# Patient Record
Sex: MALE | Race: WHITE | HISPANIC OR LATINO | Employment: FULL TIME | URBAN - METROPOLITAN AREA
[De-identification: names, ages, dates, MRNs, and addresses within clinical notes are randomized per-mention and may not be internally consistent; named-entity substitution may affect disease eponyms.]

---

## 2019-08-21 ENCOUNTER — OCCUPATIONAL MEDICINE (OUTPATIENT)
Dept: URGENT CARE | Facility: CLINIC | Age: 24
End: 2019-08-21
Payer: COMMERCIAL

## 2019-08-21 VITALS
HEIGHT: 76 IN | SYSTOLIC BLOOD PRESSURE: 138 MMHG | WEIGHT: 206.13 LBS | TEMPERATURE: 98 F | RESPIRATION RATE: 14 BRPM | OXYGEN SATURATION: 98 % | HEART RATE: 88 BPM | DIASTOLIC BLOOD PRESSURE: 70 MMHG | BODY MASS INDEX: 25.1 KG/M2

## 2019-08-21 DIAGNOSIS — S05.01XA ABRASION OF RIGHT CORNEA, INITIAL ENCOUNTER: ICD-10-CM

## 2019-08-21 DIAGNOSIS — H00.022 HORDEOLUM INTERNUM OF RIGHT LOWER EYELID: ICD-10-CM

## 2019-08-21 PROCEDURE — 99204 OFFICE O/P NEW MOD 45 MIN: CPT | Performed by: PHYSICIAN ASSISTANT

## 2019-08-21 RX ORDER — OFLOXACIN 3 MG/ML
SOLUTION/ DROPS OPHTHALMIC
Qty: 1 BOTTLE | Refills: 0 | Status: SHIPPED | OUTPATIENT
Start: 2019-08-21

## 2019-08-21 ASSESSMENT — ENCOUNTER SYMPTOMS
BLURRED VISION: 0
EYE ITCHING: 0
FEVER: 0
PHOTOPHOBIA: 0
FOREIGN BODY SENSATION: 0
TINGLING: 0
DIZZINESS: 0
EYE DISCHARGE: 0
EYE PAIN: 1
HEADACHES: 0
COUGH: 0
NAUSEA: 0
DOUBLE VISION: 0

## 2019-08-21 ASSESSMENT — VISUAL ACUITY
OS_CC: 20/20
OD_CC: 20/25

## 2019-08-21 NOTE — LETTER
[facility]  [address] [facility], [address] [place] [address]  Phone:  [phone] - Fax:  [phone]   [facility] Progress Report and Disability Certification  Date of Service: 8/21/2019   No Show:  No  Date / Time of Next Visit:     Claim Information   Patient Name: Tavares Martin  Claim Number:     Employer: [employer]  Date of Injury: 8/19/2019     Insurer / TPA: [employer]  ID / SSN:    Occupation: Maintenance Tech  Diagnosis: Diagnoses of Hordeolum internum of right lower eyelid and Abrasion of right cornea, initial encounter were pertinent to this visit.    Medical Information   Related to Industrial Injury? No  Comments:There was no specific injury or incident.  Patient noticed discomfort of the upper lid at work.    Subjective Complaints:  Date of injury was August 19, 2019.  Approximately 4:30 PM.  Patient is an employee for [employer].  Was wearing protective lenses.  Is in maintenance.  Denies known trauma or injury to the eye.  Denies any moment that he felt an injury but noticed some redness and tenderness of his right upper outer eyelid.  Noticed it worse that evening and has worsened in the last 24 hours.  Has pain when he presses on the outer lid.  Again denies previous injury that would contribute.  Denies known incident that occurred specifically at work.  States he just noticed the discomfort at work.  Denies blurry vision, double vision, discharge from the eye or eyelids.  Denies recent cough or cold   Objective Findings: General Pleasant male no acute distress  Vitals are listed unremarkable  HEENT patient has good PERRLA and extraocular movements intact.  He has increased upper lid palpebral conjunctival injection noted.  He has some redness of the right upper outer eyelid.  There is no discharge or matting noted.  No external hordeolum noted  Neck: Soft supple without cervical lymph  Cardiovascular: Regular 8 rhythm  Lungs clear to  auscultation  Musculoskeletal: Full range of motion  Neurovascular intact  Neurological: No deficit noted    Urgent CARE course: Fluorescein stain was done.  Reveals very faint and small abrasion at the 9 o'clock position of the right eye.  Patient also is tender to palpation in the right upper outer eyelid.  Inversion of upper outer lid shows a small internal hordeolum corresponding to her patient is most tender   Pre-Existing Condition(s):     Assessment:   Initial Visit    Status: Additional Care Required  Permanent Disability:No    Plan:      Diagnostics:      Comments:       Disability Information   Status: Released to Full Duty    From:     Through:   Restrictions are:     Physical Restrictions   Sitting:    Standing:    Stooping:    Bending:      Squatting:    Walking:    Climbing:    Pushing:      Pulling:    Other:    Reaching Above Shoulder (L):   Reaching Above Shoulder (R):       Reaching Below Shoulder (L):    Reaching Below Shoulder (R):      Not to exceed Weight Limits   Carrying(hrs):   Weight Limit(lb):   Lifting(hrs):   Weight  Limit(lb):     Comments: Patient is released to full duty.  However I would like him to start antibiotic drops, use warm compresses and follow-up in 3 days for further evaluation    Repetitive Actions   Hands: i.e. Fine Manipulations from Grasping:     Feet: i.e. Operating Foot Controls:     Driving / Operate Machinery:     Physician Name: Arslan [place] P.A.-C. Physician Signature: ARSLAN Roman P.A.-C. e-Signature: Dr. Dimitri Rosenbaum, Medical Director   Clinic Name / Location: [address], [address] [place] [address] Clinic Phone Number: Dept: [phone]   Appointment Time: 12:00 Pm Visit Start Time: 12:13 PM   Check-In Time:  11:58 Am Visit Discharge Time:     Original-Treating Physician or Chiropractor    Page 2-Insurer/TPA    Page 3-Employer    Page 4-Employee

## 2019-08-21 NOTE — PROGRESS NOTES
Subjective:      Tavares Martin is a 24 y.o. male who presents with Eye Problem (WC Fv, Possible exposure to chemicals/foreign body R/ eye)      Date of injury was August 19, 2019.  Approximately 4:30 PM.  Patient is an employee for [employer].  Was wearing protective lenses.  Is in maintenance.  Denies known trauma or injury to the eye.  Denies any moment that he felt an injury but noticed some redness and tenderness of his right upper outer eyelid.  Noticed it worse that evening and has worsened in the last 24 hours.  Has pain when he presses on the outer lid.  Again denies previous injury that would contribute.  Denies known incident that occurred specifically at work.  States he just noticed the discomfort at work.  Denies blurry vision, double vision, discharge from the eye or eyelids.  Denies recent cough or cold     Eye Problem    The right eye is affected. This is a new problem. Episode onset: 2 days ago. The problem occurs constantly. The problem has been gradually worsening. There was no injury mechanism. The pain is at a severity of 3/10. The pain is mild. There is no known exposure to pink eye. He does not wear contacts. Pertinent negatives include no blurred vision, eye discharge, double vision, fever, foreign body sensation, itching, nausea, photophobia or tingling. Associated symptoms comments: Painful with upper outer eyelid when pressing on it. He has tried nothing for the symptoms. The treatment provided no relief.       Past medical history: Is not pertinent to this examination  Past surgical history: Not pertinent to this examination  Family history: Is not pertinent to this examination  Allergies: No known drug allergies  Social history: Is reviewed in Epic  No current outpatient medications on file prior to visit.     No current facility-administered medications on file prior to visit.          Review of Systems   Constitutional: Negative for fever.   HENT: Negative for ear discharge and ear pain.   "  Eyes: Positive for pain. Negative for blurred vision, double vision, photophobia, discharge and itching.   Respiratory: Negative for cough.    Cardiovascular: Negative for chest pain.   Gastrointestinal: Negative for nausea.   Neurological: Negative for dizziness, tingling and headaches.   All other systems reviewed and are negative.         Objective:     /70   Pulse 88   Temp 36.7 °C (98 °F) (Temporal)   Resp 14   Ht 1.93 m (6' 4\")   Wt 93.5 kg (206 lb 2.1 oz)   SpO2 98%   BMI 25.09 kg/m²      Physical Exam   Constitutional: He appears well-developed and well-nourished.   Eyes:       As noted in diagram, small corneal abrasion seen at 9 o'clock position.  Patient also has significant tenderness to upper right eyelid in the lateral aspect       General Pleasant male no acute distress  Vitals are listed unremarkable  HEENT patient has good PERRLA and extraocular movements intact.  He has increased upper lid palpebral conjunctival injection noted.  He has some redness of the right upper outer eyelid.  There is no discharge or matting noted.  No external hordeolum noted  Neck: Soft supple without cervical lymph  Cardiovascular: Regular 8 rhythm  Lungs clear to auscultation  Musculoskeletal: Full range of motion  Neurovascular intact  Neurological: No deficit noted    Urgent CARE course: Fluorescein stain was done.  Reveals very faint and small abrasion at the 9 o'clock position of the right eye.  Patient also is tender to palpation in the right upper outer eyelid.  Inversion of upper outer lid shows a small internal hordeolum corresponding to her patient is most tender       Assessment/Plan:     1. Hordeolum internum of right lower eyelid    - ofloxacin (OCUFLOX) 0.3 % Solution; Apply one drop affected eye every two hours for two days and then every four hours for five days  Dispense: 1 Bottle; Refill: 0    2. Abrasion of right cornea, initial encounter    - ofloxacin (OCUFLOX) 0.3 % Solution; Apply one " drop affected eye every two hours for two days and then every four hours for five days  Dispense: 1 Bottle; Refill: 0      Patient denies any known incident, trauma or injury to the eye at work.  Noticed that he felt discomfort of the upper lid at work.  There is no direct correlation to internal hordeolum and work incident.  Therefore I noted in the Worker's Compensation forms that it was not necessarily directly work related.  Did fill out the C4 and D 39.  Patient can perform full duties.  Sent over antibiotic drops.  Use warm compresses.  Follow-up in 3 days    Of note I did speak with Nila Parks, to discuss case with him at 12:50 PM

## 2019-08-21 NOTE — LETTER
"EMPLOYEE’S CLAIM FOR COMPENSATION/ REPORT OF INITIAL TREATMENT  FORM C-4    EMPLOYEE’S CLAIM - PROVIDE ALL INFORMATION REQUESTED   First Name  Taavres Last Name  Mario Birthdate                    1995                Sex  male Claim Number   Home Address  [address] Age  24 y.o. 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 (if applicable)  working on [employer] fixtures    How did this injury or occupational disease occur? (Be specific an answer in detail. Use additional sheet if necessary)  Was working on [employer] fixtures, I belive I contaminated my eye while blowing off surface carrier dust / glass bead contamination   If you believe that you have an occupational disease, when did you first have knowledge of the disability and it relationship to your employment?  n/a Witnesses to the Accident  none      Nature of Injury or Occupational Disease  Workers' Compensation  Part(s) of Body Injured or Affected  Eye (R), N/A, N/A    I certify that the above is true and correct to the best of my knowledge and that I have provided this information in order " to obtain the benefits of [place]  I hereby authorize any physician, chiropractor, surgeon, practitioner, or other person, any hospital, including [facility] or [facility] hospital, any medical service organization, any insurance company, or other institution or organization to release to each other, any medical or other information, including benefits paid or payable, pertinent to this injury or disease, except information relative to diagnosis, treatment and/or counseling for AIDS, psychological conditions, alcohol or controlled substances, for which I must give specific authorization.  A Photostat of this authorization shall be as valid as the original.     Date   Place   Employee’s Signature   THIS REPORT MUST BE COMPLETED AND MAILED WITHIN 3 WORKING DAYS OF TREATMENT   Place  [facility]  Name of Facility  [facility]   Date  8/21/2019 Diagnosis  (H00.022) Hordeolum internum of right lower eyelid  (S05.01XA) Abrasion of right cornea, initial encounter Is there evidence the injured employee was under the influence of alcohol and/or another controlled substance at the time of accident?   Hour  12:13 PM Description of Injury or Disease  Diagnoses of Hordeolum internum of right lower eyelid and Abrasion of right cornea, initial encounter were pertinent to this visit. No   Treatment  Warm compresses recommended 10 minutes a day, 3 times daily.  Take antibiotic drops as directed.  Ofloxacin drops for the week  Have you advised the patient to remain off work five days or more? No   X-Ray Findings    Comments:Not applicable   If Yes   From Date  To Date      From information given by the employee, together with medical evidence, can you directly connect this injury or occupational disease as job incurred?  No  Comments:There was no known trauma or injury to the eye.  Patient has signs  "suggestive of an internal hordeolum.  Very faint corneal abrasion at the 9 o'clock position of the right eye as well. If No Full Duty  Yes Modified Duty      Is additional medical care by a physician indicated?  Yes  Comments:Recommend follow-up in the next 3 business days for further evaluation If Modified Duty, Specify any Limitations / Restrictions      Do you know of any previous injury or disease contributing to this condition or occupational disease?                            No   Date  8/21/2019 Print Doctor’s Name Arslan [place] P.A.-C. I certify the employer’s copy of  this form was mailed on:   Address  [address] Insurer’s Use Only     [place] Zip  [address]    Provider’s Tax ID Number  [id number] Telephone  Dept: [phone]        e-ARSLAN Gaxiola P.A.-C.   e-Signature: Dr. Dimitri Rosenbaum, Medical Director Degree  MD        ORIGINAL-TREATING PHYSICIAN OR CHIROPRACTOR    PAGE 2-INSURER/TPA    PAGE 3-EMPLOYER    PAGE 4-EMPLOYEE             Form C-4 (rev10/07)              BRIEF DESCRIPTION OF RIGHTS AND BENEFITS  (Pursuant to [place] 616C.[place])    Notice of Injury or Occupational Disease (Incident Report Form C-1): If an injury or occupational disease (OD) arises out of and in the  course of employment, you must provide written notice to your employer as soon as practicable, but no later than 7 days after the accident or  OD. Your employer shall maintain a sufficient supply of the required forms.    Claim for Compensation (Form C-4): If medical treatment is sought, the form C-4 is available at the place of initial treatment. A completed  \"Claim for Compensation\" (Form C-4) must be filed within 90 days after an accident or OD. The treating physician or chiropractor must,  within 3 working days after treatment, complete and mail to the employer, the employer's insurer and third-party , the Claim for  Compensation.    Medical Treatment: If you require medical " treatment for your on-the-job injury or OD, you may be required to select a physician or  chiropractor from a list provided by your workers’ compensation insurer, if it has contracted with an Organization for Managed Care (MCO) or  Preferred Provider Organization (PPO) or providers of health care. If your employer has not entered into a contract with an MCO or PPO, you  may select a physician or chiropractor from the Panel of Physicians and Chiropractors. Any medical costs related to your industrial injury or  OD will be paid by your insurer.    Temporary Total Disability (TTD): If your doctor has certified that you are unable to work for a period of at least 5 consecutive days, or 5  cumulative days in a 20-day period, or places restrictions on you that your employer does not accommodate, you may be entitled to TTD  compensation.    Temporary Partial Disability (TPD): If the wage you receive upon reemployment is less than the compensation for TTD to which you are  entitled, the insurer may be required to pay you TPD compensation to make up the difference. TPD can only be paid for a maximum of 24  months.    Permanent Partial Disability (PPD): When your medical condition is stable and there is an indication of a PPD as a result of your injury or  OD, within 30 days, your insurer must arrange for an evaluation by a rating physician or chiropractor to determine the degree of your PPD. The  amount of your PPD award depends on the date of injury, the results of the PPD evaluation and your age and wage.    Permanent Total Disability (PTD): If you are medically certified by a treating physician or chiropractor as permanently and totally disabled  and have been granted a PTD status by your insurer, you are entitled to receive monthly benefits not to exceed 66 2/3% of your average  monthly wage. The amount of your PTD payments is subject to reduction if you previously received a PPD award.    Vocational Rehabilitation  Services: You may be eligible for vocational rehabilitation services if you are unable to return to the job due to a  permanent physical impairment or permanent restrictions as a result of your injury or occupational disease.    Transportation and Per Janis Reimbursement: You may be eligible for travel expenses and per janis associated with medical treatment.    Reopening: You may be able to reopen your claim if your condition worsens after claim closure.    Appeal Process: If you disagree with a written determination issued by the insurer or the insurer does not respond to your request, you may  appeal to the Department of Administration, , by following the instructions contained in your determination letter. You must  appeal the determination within 70 days from the date of the determination letter at [address], [address], [place], [place]  [address], or [address], [address], [place], [place] [address]. If you disagree with the  decision, you may appeal to the  Department of Administration, . You must file your appeal within 30 days from the date of the  decision  letter at [address], [address], [place], [place] [address], or [address], [address], [place], [place] [address]. If you  disagree with a decision of an , you may file a petition for judicial review with the District Court. You must do so within 30  days of the Appeal Officer’s decision. You may be represented by an  at your own expense or you may contact the [facility] for possible  representation.    [place]  for Injured Workers (NAIW): If you disagree with a  decision, you may request that NAIW represent you  without charge at an  Hearing. For information regarding denial of benefits, you may contact the [facility] at: [address], [address], [place] [address], [phone], or [address], [address], [place] [address], [phone]    To File a Complaint with the Division: If you wish to file a complaint with the  of the Division of Industrial Relations (DIR),  please contact the Workers’ Compensation Section, [address], [address], [place], [place] [address], telephone (420) [phone], or  [address], [address], [place], [place] [address], telephone [phone]) [phone].    For assistance with Workers’ Compensation Issues: you may contact the Office of the Governor Consumer Health Assistance, [address] [place] [address], [address], [place], [place] [address], Toll Free 1-217.537.4662, Web site: http://govcha.[facility].nv.us, E-mail  Jayla@[email or web address].[email or web address].                                                                                                                                                                                                                                   __________________________________________________________________                                                                   _________________                Employee Name / Signature                                                                                                                                                       Date                                                                                                                                                                                                     D-2 (rev. 10/07)

## 2020-10-26 ENCOUNTER — OFFICE VISIT (OUTPATIENT)
Dept: URGENT CARE | Facility: CLINIC | Age: 25
End: 2020-10-26
Payer: COMMERCIAL

## 2020-10-26 VITALS
DIASTOLIC BLOOD PRESSURE: 70 MMHG | HEART RATE: 82 BPM | RESPIRATION RATE: 12 BRPM | WEIGHT: 230 LBS | SYSTOLIC BLOOD PRESSURE: 126 MMHG | HEIGHT: 76 IN | TEMPERATURE: 98.4 F | OXYGEN SATURATION: 98 % | BODY MASS INDEX: 28.01 KG/M2

## 2020-10-26 DIAGNOSIS — J02.9 PHARYNGITIS, UNSPECIFIED ETIOLOGY: ICD-10-CM

## 2020-10-26 DIAGNOSIS — J02.0 STREP THROAT: ICD-10-CM

## 2020-10-26 LAB
INT CON NEG: NORMAL
INT CON POS: NORMAL
S PYO AG THROAT QL: NORMAL

## 2020-10-26 PROCEDURE — 99214 OFFICE O/P EST MOD 30 MIN: CPT | Performed by: NURSE PRACTITIONER

## 2020-10-26 PROCEDURE — 87880 STREP A ASSAY W/OPTIC: CPT | Performed by: NURSE PRACTITIONER

## 2020-10-26 RX ORDER — AMOXICILLIN 500 MG/1
1000 CAPSULE ORAL DAILY
Qty: 20 CAP | Refills: 0 | Status: SHIPPED | OUTPATIENT
Start: 2020-10-26 | End: 2020-11-05

## 2020-10-26 ASSESSMENT — ENCOUNTER SYMPTOMS
VOMITING: 0
SPUTUM PRODUCTION: 0
DIARRHEA: 0
CHILLS: 1
PALPITATIONS: 0
ABDOMINAL PAIN: 0
NAUSEA: 0
COUGH: 0
FEVER: 0
DIAPHORESIS: 0
WHEEZING: 0
SORE THROAT: 1
ORTHOPNEA: 0
SINUS PAIN: 0
HEMOPTYSIS: 0
SHORTNESS OF BREATH: 0

## 2020-10-26 NOTE — LETTER
October 26, 2020         Patient: Tavares Martin   YOB: 1995   Date of Visit: 10/26/2020           To Whom it May Concern:    Tavares Martin was seen in my clinic on 10/26/2020 for strep throat. He may return to work in 1-2 days as symptoms improve and after having been taking antibiotics for 24 hours.     If you have any questions or concerns, please don't hesitate to call.        Sincerely,           GEO Malone.  Electronically Signed

## 2020-10-26 NOTE — LETTER
October 26, 2020         Patient: Tavares Martin   YOB: 1995   Date of Visit: 10/26/2020           To Whom it May Concern:    Tavares Martin was seen in my clinic on 10/26/2020. He may return to work in 1-2 days as symptoms improve and after having been taking antibiotics for 24 hours.      If you have any questions or concerns, please don't hesitate to call.        Sincerely,           GEO Malone.  Electronically Signed

## 2020-10-26 NOTE — PROGRESS NOTES
"Subjective:      Tavares Martin is a 25 y.o. male who presents with Pharyngitis (x today  / )            Tavares comes in today with a 1 day history of pharyngitis.  Associated factors: chills, fatigue and slight nasal congestion.  Patient has tried nothing to treat the symptoms.  Denies any fever, cough, chest pain, shortness of breath, nausea, vomiting, or diarrhea.  Significant history: a co-worker recently tested positive for strep throat.        Review of Systems   Constitutional: Positive for chills and malaise/fatigue. Negative for diaphoresis and fever.   HENT: Positive for congestion and sore throat. Negative for ear pain and sinus pain.    Respiratory: Negative for cough, hemoptysis, sputum production, shortness of breath and wheezing.    Cardiovascular: Negative for chest pain, palpitations and orthopnea.   Gastrointestinal: Negative for abdominal pain, diarrhea, nausea and vomiting.   Skin: Negative for rash.     Medications, Allergies, and current problem list reviewed today in Epic     Objective:     Blood Pressure 126/70   Pulse 82   Temperature 36.9 °C (98.4 °F) (Temporal)   Respiration 12   Height 1.93 m (6' 4\")   Weight 104.3 kg (230 lb)   Oxygen Saturation 98%   Body Mass Index 28.00 kg/m²      Physical Exam  Vitals signs reviewed.   Constitutional:       General: He is not in acute distress.     Appearance: Normal appearance. He is well-developed. He is not ill-appearing, toxic-appearing or diaphoretic.   HENT:      Head: Normocephalic.      Right Ear: Tympanic membrane, ear canal and external ear normal. There is no impacted cerumen.      Left Ear: Tympanic membrane, ear canal and external ear normal. There is no impacted cerumen.      Nose: Congestion present.      Comments: Nares contested (left worse than right) with clear nasal drainage.  NO sinus TTP.     Mouth/Throat:      Mouth: Mucous membranes are moist.      Pharynx: Posterior oropharyngeal erythema present. No oropharyngeal " exudate.      Comments: Generalized oropharyngeal erythema with 2+ bilateral tonsil enlargement.  NO exudate or edema.  Uvula midline.  NO muffled voice.  Eyes:      General: No scleral icterus.        Right eye: No discharge.         Left eye: No discharge.      Conjunctiva/sclera: Conjunctivae normal.      Pupils: Pupils are equal, round, and reactive to light.   Neck:      Musculoskeletal: Neck supple.      Thyroid: No thyromegaly.      Vascular: No JVD.      Trachea: No tracheal deviation.   Cardiovascular:      Rate and Rhythm: Normal rate and regular rhythm.      Heart sounds: Normal heart sounds. No murmur. No friction rub. No gallop.    Pulmonary:      Effort: Pulmonary effort is normal. No respiratory distress.      Breath sounds: Normal breath sounds. No stridor. No wheezing or rales.   Chest:      Chest wall: No tenderness.   Lymphadenopathy:      Cervical: No cervical adenopathy.   Skin:     General: Skin is warm and dry.      Coloration: Skin is not pale.      Findings: No erythema.   Neurological:      Mental Status: He is alert and oriented to person, place, and time.       POCT rapid strep a: positive          Assessment/Plan:        1. Strep throat  - amoxicillin (AMOXIL) 500 MG Cap; Take 2 Caps by mouth every day for 10 days.  Dispense: 20 Cap; Refill: 0    2. Pharyngitis, unspecified etiology  - POCT Rapid Strep A    Discussed exam findings with Tavares.  Take full course of antibiotics.  OTC NSAIDs or tylenol prn fever, pain.  OTC throat sprays prn symptom management.  Maintain adequate po hydration.  RTC in 5-7 days if symptoms persist, sooner if worse.  Patient verbalized understanding of and agreed with plan of care.

## 2023-10-09 ENCOUNTER — OFFICE VISIT (OUTPATIENT)
Dept: URGENT CARE | Facility: CLINIC | Age: 28
End: 2023-10-09
Payer: COMMERCIAL

## 2023-10-09 VITALS
OXYGEN SATURATION: 99 % | WEIGHT: 223 LBS | BODY MASS INDEX: 27.16 KG/M2 | DIASTOLIC BLOOD PRESSURE: 88 MMHG | RESPIRATION RATE: 12 BRPM | SYSTOLIC BLOOD PRESSURE: 138 MMHG | HEART RATE: 80 BPM | HEIGHT: 76 IN | TEMPERATURE: 97.6 F

## 2023-10-09 DIAGNOSIS — B35.6 TINEA CRURIS: ICD-10-CM

## 2023-10-09 PROCEDURE — 3075F SYST BP GE 130 - 139MM HG: CPT | Performed by: PHYSICIAN ASSISTANT

## 2023-10-09 PROCEDURE — 3079F DIAST BP 80-89 MM HG: CPT | Performed by: PHYSICIAN ASSISTANT

## 2023-10-09 PROCEDURE — 99213 OFFICE O/P EST LOW 20 MIN: CPT | Performed by: PHYSICIAN ASSISTANT

## 2023-10-09 RX ORDER — KETOCONAZOLE 20 MG/G
1 CREAM TOPICAL DAILY
Qty: 60 G | Refills: 0 | Status: SHIPPED | OUTPATIENT
Start: 2023-10-09 | End: 2023-10-23

## 2023-10-09 RX ORDER — TRIAMCINOLONE ACETONIDE 1 MG/G
1 CREAM TOPICAL 2 TIMES DAILY
Qty: 45 G | Refills: 0 | Status: SHIPPED | OUTPATIENT
Start: 2023-10-09 | End: 2023-10-16

## 2023-10-09 ASSESSMENT — ENCOUNTER SYMPTOMS
CHILLS: 0
FEVER: 0
ABDOMINAL PAIN: 0

## 2023-10-10 NOTE — PROGRESS NOTES
"Subjective:     CHIEF COMPLAINT  Chief Complaint   Patient presents with    Rash     Groin/scrotum itch, peeling x 2 weeks        HPI  Tavares Martin is a very pleasant 28 y.o. male who presents to the clinic with a pruritic/burning rash near his groin region x2 weeks.  Patient states skin is inflamed and alternates between burning and itching.  States skin is slightly dry and peeling.  Denies any vesicles.  Denies any dysuria or penile discharge.  No history of HSV.  No concern for STI.  No new soaps or detergents.  He has tried applying Aquaphor without improvement.    REVIEW OF SYSTEMS  Review of Systems   Constitutional:  Negative for chills and fever.   Gastrointestinal:  Negative for abdominal pain.   Genitourinary:  Negative for dysuria.   Skin:  Positive for itching and rash.       PAST MEDICAL HISTORY  There are no problems to display for this patient.      SURGICAL HISTORY  patient denies any surgical history    ALLERGIES  No Known Allergies    CURRENT MEDICATIONS  Home Medications       Reviewed by Phillip Shipley P.A.-C. (Physician Assistant) on 10/09/23 at 1700  Med List Status: <None>     Medication Last Dose Status   ofloxacin (OCUFLOX) 0.3 % Solution Not Taking Active                    SOCIAL HISTORY  Social History     Tobacco Use    Smoking status: Never    Smokeless tobacco: Never   Vaping Use    Vaping Use: Never used   Substance and Sexual Activity    Alcohol use: Not Currently     Comment: rare    Drug use: Never    Sexual activity: Not on file       FAMILY HISTORY  History reviewed. No pertinent family history.       Objective:     VITAL SIGNS: /88   Pulse 80   Temp 36.4 °C (97.6 °F) (Temporal)   Resp 12   Ht 1.93 m (6' 4\")   Wt 101 kg (223 lb)   SpO2 99%   BMI 27.14 kg/m²     PHYSICAL EXAM  Physical Exam  Constitutional:       Appearance: Normal appearance.   HENT:      Head: Normocephalic and atraumatic.   Eyes:      Conjunctiva/sclera: Conjunctivae normal.   Pulmonary:      " Effort: Pulmonary effort is normal.   Genitourinary:     Comments: Patient has a dry erythematous rash to the scrotum and perineal area.  No testicular pain or swelling.  No vesicles or pustules present.  No penile discharge.  Musculoskeletal:      Cervical back: Normal range of motion.   Neurological:      General: No focal deficit present.      Mental Status: He is alert and oriented to person, place, and time. Mental status is at baseline.         Assessment/Plan:     1. Tinea cruris  - ketoconazole (NIZORAL) 2 % Cream; Apply 1 Application topically every day for 14 days.  Dispense: 60 g; Refill: 0  - triamcinolone acetonide (KENALOG) 0.1 % Cream; Apply 1 Application topically 2 times a day for 7 days.  Dispense: 45 g; Refill: 0      MDM/Comments:    Findings consistent with tinea cruris.  Start patient on a course of ketoconazole.  Triamcinolone twice daily x1 week for inflammation and pruritus.  Keep area clean and dry.    Differential diagnosis, natural history, supportive care, and indications for immediate follow-up discussed. All questions answered. Patient agrees with the plan of care.    Follow-up as needed if symptoms worsen or fail to improve to PCP, Urgent care or Emergency Room.    I have personally reviewed prior external notes and test results pertinent to today's visit.  I have independently reviewed and interpreted all diagnostics ordered during this urgent care acute visit.   Discussed management options (risks,benefits, and alternatives to treatment). Pt expresses understanding and the treatment plan was agreed upon. Questions were encouraged and answered to pt's satisfaction.    Please note that this dictation was created using voice recognition software. I have made a reasonable attempt to correct obvious errors, but I expect that there are errors of grammar and possibly content that I did not discover before finalizing the note.